# Patient Record
Sex: MALE | Race: WHITE | Employment: OTHER | URBAN - METROPOLITAN AREA
[De-identification: names, ages, dates, MRNs, and addresses within clinical notes are randomized per-mention and may not be internally consistent; named-entity substitution may affect disease eponyms.]

---

## 2017-05-25 PROBLEM — M54.17 LUMBOSACRAL RADICULITIS: Status: ACTIVE | Noted: 2017-05-25

## 2017-10-31 PROBLEM — M48.062 SPINAL STENOSIS OF LUMBAR REGION WITH NEUROGENIC CLAUDICATION: Status: ACTIVE | Noted: 2017-10-31

## 2018-06-25 ENCOUNTER — HOSPITAL ENCOUNTER (OUTPATIENT)
Age: 73
Discharge: HOME | End: 2018-06-25
Payer: COMMERCIAL

## 2018-06-25 LAB
SEDIMENTATION RATE, ERYTHROCYTE: 8 MM/HR (ref 0–15)
URIC ACID, SERUM: 5.8 MG/DL (ref 3.4–7)

## 2018-06-25 PROCEDURE — 36415 COLL VENOUS BLD VENIPUNCTURE: CPT

## 2018-06-25 PROCEDURE — 86038 ANTINUCLEAR ANTIBODIES: CPT

## 2018-06-25 PROCEDURE — 86431 RHEUMATOID FACTOR QUANT: CPT

## 2018-06-25 PROCEDURE — 84550 ASSAY OF BLOOD/URIC ACID: CPT

## 2018-06-25 PROCEDURE — 85651 RBC SED RATE NONAUTOMATED: CPT

## 2018-06-26 LAB
ANTI-NUCLEAR ANTIBODY (ANA): NEGATIVE
RHEUMATOID FACTOR: 14 IU/ML (ref 0–13)

## 2019-06-13 ENCOUNTER — TELEPHONE (OUTPATIENT)
Dept: FAMILY MEDICINE CLINIC | Age: 74
End: 2019-06-13

## 2019-06-13 RX ORDER — MELOXICAM 15 MG/1
15 TABLET ORAL 2 TIMES DAILY
COMMUNITY
Start: 2018-12-27 | End: 2019-06-14 | Stop reason: SDUPTHER

## 2019-06-17 RX ORDER — MELOXICAM 15 MG/1
15 TABLET ORAL 2 TIMES DAILY
Qty: 180 TABLET | Refills: 1 | Status: SHIPPED | OUTPATIENT
Start: 2019-06-17 | End: 2019-07-29 | Stop reason: SDUPTHER

## 2019-07-29 RX ORDER — MELOXICAM 15 MG/1
15 TABLET ORAL DAILY
Qty: 90 TABLET | Refills: 1 | Status: SHIPPED | OUTPATIENT
Start: 2019-07-29

## 2019-07-30 ENCOUNTER — OFFICE VISIT (OUTPATIENT)
Dept: RHEUMATOLOGY | Age: 74
End: 2019-07-30
Payer: COMMERCIAL

## 2019-07-30 ENCOUNTER — HOSPITAL ENCOUNTER (OUTPATIENT)
Age: 74
Discharge: HOME | End: 2019-08-01
Payer: COMMERCIAL

## 2019-07-30 VITALS
HEIGHT: 66 IN | WEIGHT: 152.6 LBS | HEART RATE: 70 BPM | BODY MASS INDEX: 24.53 KG/M2 | OXYGEN SATURATION: 98 % | SYSTOLIC BLOOD PRESSURE: 118 MMHG | TEMPERATURE: 98 F | DIASTOLIC BLOOD PRESSURE: 76 MMHG

## 2019-07-30 DIAGNOSIS — M15.9 PRIMARY OSTEOARTHRITIS INVOLVING MULTIPLE JOINTS: ICD-10-CM

## 2019-07-30 LAB
ALBUMIN SERPL-MCNC: 4.4 G/DL (ref 3.5–5.2)
ALP BLD-CCNC: 100 U/L (ref 40–129)
ALT SERPL-CCNC: 24 U/L (ref 0–40)
ANION GAP SERPL CALCULATED.3IONS-SCNC: 14 MMOL/L (ref 7–16)
AST SERPL-CCNC: 30 U/L (ref 0–39)
BASOPHILS ABSOLUTE: 0.04 E9/L (ref 0–0.2)
BASOPHILS RELATIVE PERCENT: 0.4 % (ref 0–2)
BILIRUB SERPL-MCNC: 0.6 MG/DL (ref 0–1.2)
BUN BLDV-MCNC: 16 MG/DL (ref 8–23)
CALCIUM SERPL-MCNC: 9.4 MG/DL (ref 8.6–10.2)
CHLORIDE BLD-SCNC: 101 MMOL/L (ref 98–107)
CO2: 25 MMOL/L (ref 22–29)
CREAT SERPL-MCNC: 1.1 MG/DL (ref 0.7–1.2)
EOSINOPHILS ABSOLUTE: 0.18 E9/L (ref 0.05–0.5)
EOSINOPHILS RELATIVE PERCENT: 1.9 % (ref 0–6)
GFR AFRICAN AMERICAN: >60
GFR NON-AFRICAN AMERICAN: >60 ML/MIN/1.73
GLUCOSE BLD-MCNC: 84 MG/DL (ref 74–99)
HCT VFR BLD CALC: 47.9 % (ref 37–54)
HEMOGLOBIN: 15.9 G/DL (ref 12.5–16.5)
IMMATURE GRANULOCYTES #: 0.02 E9/L
IMMATURE GRANULOCYTES %: 0.2 % (ref 0–5)
LYMPHOCYTES ABSOLUTE: 2.25 E9/L (ref 1.5–4)
LYMPHOCYTES RELATIVE PERCENT: 23.3 % (ref 20–42)
MCH RBC QN AUTO: 32.4 PG (ref 26–35)
MCHC RBC AUTO-ENTMCNC: 33.2 % (ref 32–34.5)
MCV RBC AUTO: 97.8 FL (ref 80–99.9)
MONOCYTES ABSOLUTE: 0.8 E9/L (ref 0.1–0.95)
MONOCYTES RELATIVE PERCENT: 8.3 % (ref 2–12)
NEUTROPHILS ABSOLUTE: 6.36 E9/L (ref 1.8–7.3)
NEUTROPHILS RELATIVE PERCENT: 65.9 % (ref 43–80)
PDW BLD-RTO: 12.8 FL (ref 11.5–15)
PLATELET # BLD: 237 E9/L (ref 130–450)
PMV BLD AUTO: 11.8 FL (ref 7–12)
POTASSIUM SERPL-SCNC: 4.7 MMOL/L (ref 3.5–5)
RBC # BLD: 4.9 E12/L (ref 3.8–5.8)
SODIUM BLD-SCNC: 140 MMOL/L (ref 132–146)
TOTAL PROTEIN: 7.6 G/DL (ref 6.4–8.3)
WBC # BLD: 9.7 E9/L (ref 4.5–11.5)

## 2019-07-30 PROCEDURE — 80053 COMPREHEN METABOLIC PANEL: CPT

## 2019-07-30 PROCEDURE — 36415 COLL VENOUS BLD VENIPUNCTURE: CPT

## 2019-07-30 PROCEDURE — 99214 OFFICE O/P EST MOD 30 MIN: CPT | Performed by: INTERNAL MEDICINE

## 2019-07-30 PROCEDURE — 85025 COMPLETE CBC W/AUTO DIFF WBC: CPT

## 2019-07-30 RX ORDER — IMIQUIMOD 12.5 MG/.25G
CREAM TOPICAL
Refills: 0 | COMMUNITY
Start: 2019-07-01 | End: 2020-08-05

## 2019-07-30 RX ORDER — TRIAMCINOLONE ACETONIDE 1 MG/G
CREAM TOPICAL
Refills: 0 | COMMUNITY
Start: 2019-06-03 | End: 2020-08-05

## 2019-07-30 ASSESSMENT — ENCOUNTER SYMPTOMS
DIARRHEA: 0
EYE PAIN: 0
COUGH: 0
SORE THROAT: 0
ABDOMINAL PAIN: 0
TROUBLE SWALLOWING: 0
SINUS PRESSURE: 0
BLOOD IN STOOL: 0
SINUS PAIN: 0
COLOR CHANGE: 0
EYE DISCHARGE: 0
SHORTNESS OF BREATH: 0
WHEEZING: 0
CONSTIPATION: 0
EYE REDNESS: 0
PHOTOPHOBIA: 0
VOMITING: 0
ABDOMINAL DISTENTION: 0
CHEST TIGHTNESS: 0
EYE ITCHING: 0

## 2019-07-30 NOTE — PROGRESS NOTES
medication toxicity. Return if symptoms worsen or fail to improve. Benjamin Liao MD     *This document was created using voice recognition software. Note was reviewed, however may contain grammatical errors.

## 2019-08-01 ENCOUNTER — TELEPHONE (OUTPATIENT)
Dept: RHEUMATOLOGY | Age: 74
End: 2019-08-01

## 2019-08-01 DIAGNOSIS — M15.9 PRIMARY OSTEOARTHRITIS INVOLVING MULTIPLE JOINTS: Primary | ICD-10-CM

## 2019-08-13 ENCOUNTER — TELEPHONE (OUTPATIENT)
Dept: RHEUMATOLOGY | Age: 74
End: 2019-08-13

## 2020-02-05 ENCOUNTER — HOSPITAL ENCOUNTER (OUTPATIENT)
Age: 75
Discharge: HOME | End: 2020-02-05
Payer: COMMERCIAL

## 2020-02-05 LAB
RHEUMATOID FACTOR: 16 IU/ML (ref 0–13)
SEDIMENTATION RATE, ERYTHROCYTE: 5 MM/HR (ref 0–15)
URIC ACID, SERUM: 6.1 MG/DL (ref 3.4–7)

## 2020-02-05 PROCEDURE — 86038 ANTINUCLEAR ANTIBODIES: CPT

## 2020-02-05 PROCEDURE — 86431 RHEUMATOID FACTOR QUANT: CPT

## 2020-02-05 PROCEDURE — 36415 COLL VENOUS BLD VENIPUNCTURE: CPT

## 2020-02-05 PROCEDURE — 85651 RBC SED RATE NONAUTOMATED: CPT

## 2020-02-05 PROCEDURE — 84550 ASSAY OF BLOOD/URIC ACID: CPT

## 2020-02-06 LAB — ANTI-NUCLEAR ANTIBODY (ANA): NEGATIVE

## 2020-08-05 ENCOUNTER — HOSPITAL ENCOUNTER (EMERGENCY)
Age: 75
Discharge: HOME | End: 2020-08-05
Payer: COMMERCIAL

## 2020-08-05 ENCOUNTER — APPOINTMENT (OUTPATIENT)
Dept: GENERAL RADIOLOGY | Age: 75
End: 2020-08-05
Payer: COMMERCIAL

## 2020-08-05 VITALS
TEMPERATURE: 97.8 F | HEART RATE: 89 BPM | SYSTOLIC BLOOD PRESSURE: 140 MMHG | OXYGEN SATURATION: 96 % | HEIGHT: 66 IN | WEIGHT: 150 LBS | RESPIRATION RATE: 16 BRPM | DIASTOLIC BLOOD PRESSURE: 82 MMHG | BODY MASS INDEX: 24.11 KG/M2

## 2020-08-05 PROCEDURE — 73630 X-RAY EXAM OF FOOT: CPT

## 2020-08-05 PROCEDURE — 99283 EMERGENCY DEPT VISIT LOW MDM: CPT

## 2020-08-05 PROCEDURE — 99281 EMR DPT VST MAYX REQ PHY/QHP: CPT

## 2020-08-05 PROCEDURE — 73590 X-RAY EXAM OF LOWER LEG: CPT

## 2020-08-05 PROCEDURE — 73610 X-RAY EXAM OF ANKLE: CPT

## 2020-08-05 RX ORDER — FOLIC ACID 1 MG/1
1 TABLET ORAL DAILY
COMMUNITY

## 2020-08-05 ASSESSMENT — PAIN DESCRIPTION - PAIN TYPE: TYPE: ACUTE PAIN

## 2020-08-05 ASSESSMENT — PAIN DESCRIPTION - PROGRESSION: CLINICAL_PROGRESSION: GRADUALLY WORSENING

## 2020-08-05 ASSESSMENT — PAIN DESCRIPTION - DESCRIPTORS: DESCRIPTORS: SORE

## 2020-08-05 ASSESSMENT — PAIN SCALES - GENERAL: PAINLEVEL_OUTOF10: 6

## 2020-08-05 ASSESSMENT — PAIN DESCRIPTION - ONSET: ONSET: SUDDEN

## 2020-08-05 ASSESSMENT — PAIN DESCRIPTION - LOCATION: LOCATION: ANKLE;LEG

## 2020-08-05 ASSESSMENT — PAIN DESCRIPTION - ORIENTATION: ORIENTATION: RIGHT

## 2020-08-05 ASSESSMENT — PAIN DESCRIPTION - FREQUENCY: FREQUENCY: CONTINUOUS

## 2020-08-05 NOTE — ED PROVIDER NOTES
[facility]    HPI:  8/5/20, Time: 1:55 PM EDT         Taylor Costa is a 69 y.o. male presenting to the ED for right ankle and lower leg pain, beginning 9 days ago after a fall. The complaint has been persistent, moderate in severity, and worsened by standing, walking. Patient states that he did have pain to the lateral right ankle at that time and then reinjured his ankle by twisting and again approximately 5 days ago. Patient reports that the pain is located to the right lateral foot and ankle that does extend several inches up into the right lateral lower leg. Denies any calf pain or leg swelling. Mild swelling to the right ankle has been noticed over the last several days. There is also been ecchymosis present for the last 9 days to that area. Denies any numbness tingling to the area. Full range of motion of all the joints in the right lower extremity. He has been afebrile at home without recent travel or sick contacts. Patient denies all other symptoms at this time. Review of Systems:   Pertinent positives and negatives are stated within HPI, all other systems reviewed and are negative.          --------------------------------------------- PAST HISTORY ---------------------------------------------  Past Medical History:  has a past medical history of Osteoarthritis. Past Surgical History:  has a past surgical history that includes Nerve Block (Bilateral, 05/25/2017); Nerve Block (Bilateral, 05/25/2017); Nerve Block (Bilateral, 06/08/2017); Nerve Block (12/14/2017); and Nerve Block (N/A, 02/08/2018). Social History:  reports that he has been smoking cigarettes. He has a 7.50 pack-year smoking history. He has never used smokeless tobacco. He reports that he does not drink alcohol or use drugs. Family History: family history includes Cancer in his father; Heart Disease in his mother. The patients home medications have been reviewed.     Allergies: Patient has no known allergies. -------------------------------------------------- RESULTS -------------------------------------------------  All laboratory and radiology results have been personally reviewed by myself   LABS:  No results found for this visit on 08/05/20. RADIOLOGY:  Interpreted by Radiologist.  XR FOOT RIGHT (MIN 3 VIEWS)   Final Result   1. Degenerative changes of the forefoot without evidence of fracture   or acute bony misalignment. Early bunion changes are developing at the   first MTP joint. 2. Lateral ankle soft tissue swelling could indicate a right ankle   sprain, but no underlying fracture or misalignment is noted. 3. Probable lateral ankle sprain, but no evidence of tibia or fibular   trauma, and no evidence of knee joint effusion or acute trauma. XR TIBIA FIBULA RIGHT (2 VIEWS)   Final Result   1. Degenerative changes of the forefoot without evidence of fracture   or acute bony misalignment. Early bunion changes are developing at the   first MTP joint. 2. Lateral ankle soft tissue swelling could indicate a right ankle   sprain, but no underlying fracture or misalignment is noted. 3. Probable lateral ankle sprain, but no evidence of tibia or fibular   trauma, and no evidence of knee joint effusion or acute trauma. XR ANKLE RIGHT (MIN 3 VIEWS)   Final Result   1. Degenerative changes of the forefoot without evidence of fracture   or acute bony misalignment. Early bunion changes are developing at the   first MTP joint. 2. Lateral ankle soft tissue swelling could indicate a right ankle   sprain, but no underlying fracture or misalignment is noted. 3. Probable lateral ankle sprain, but no evidence of tibia or fibular   trauma, and no evidence of knee joint effusion or acute trauma. ------------------------- NURSING NOTES AND VITALS REVIEWED ---------------------------   The nursing notes within the ED encounter and vital signs as below have been reviewed.    BP (!) 140/82 specific details for the plan of care and counseling regarding the diagnosis and prognosis. Questions are answered at this time and they are agreeable with the plan.      --------------------------------- IMPRESSION AND DISPOSITION ---------------------------------    IMPRESSION  1. Sprain of anterior talofibular ligament of right ankle, initial encounter    2. Strain of muscle(s) and tendon(s) of peroneal muscle group at lower leg level, right leg, initial encounter        DISPOSITION  Disposition: Discharge to home  Patient condition is stable      NOTE: This report was transcribed using voice recognition software.  Every effort was made to ensure accuracy; however, inadvertent computerized transcription errors may be present        Roula Meadma  08/05/20 1942

## 2020-10-06 ENCOUNTER — HOSPITAL ENCOUNTER (OUTPATIENT)
Dept: PHYSICAL THERAPY | Age: 75
Setting detail: THERAPIES SERIES
Discharge: HOME | End: 2020-10-06
Payer: COMMERCIAL

## 2020-10-09 ENCOUNTER — HOSPITAL ENCOUNTER (OUTPATIENT)
Dept: PHYSICAL THERAPY | Age: 75
Setting detail: THERAPIES SERIES
Discharge: HOME | End: 2020-10-09
Payer: COMMERCIAL

## 2020-10-09 PROCEDURE — 97161 PT EVAL LOW COMPLEX 20 MIN: CPT | Performed by: PHYSICAL THERAPIST

## 2020-10-09 NOTE — PROGRESS NOTES
[address] [facility]                Phone: [phone]   Fax: [phone]    Physical Therapy Daily Treatment Note  Date:  10/9/2020    Patient Name:  Emily Nguyễn    :    MRN: [id number]    Evaluating therapist:  MURRAY Prakash                    Andrew  Restrictions/Precautions:    Diagnosis:  vascular diseases  Treatment Diagnosis:    Insurance/Certification information:  MMO  PPO  Referring Physician:  Clari Garcia. Plan of care signed (Y/N):  Y  Visit# / total visits:    Pain level: /10   Time In:  Time Out:    Subjective:      Exercises:  Exercise/Equipment Resistance/Repetitions Other comments   StepOne              seated hip abd                         flex                 knee ext            sit/stand            toe raises     step ups             standing for time            single leg stance      foam marching       side/side amb                              Other Therapeutic Activities:      Home Exercise Program:      Manual Treatments:      Modalities:      Timed Code Treatment Minutes: Total Treatment Minutes:      Treatment/Activity Tolerance:  [] Patient tolerated treatment well [] Patient limited by fatique  [] Patient limited by pain  [] Patient limited by other medical complications  [] Other:     Prognosis: [] Good [] Fair  [] Poor    Patient Requires Follow-up: [] Yes  [] No    Plan:   [] Continue per plan of care [] Alter current plan (see comments)  [] Plan of care initiated [] Hold pending MD visit [] Discharge  Plan for Next Session:      See Weekly Progress Note: []  Yes  []  No  Next due:        Electronically signed by:   Marina Schmitt

## 2020-10-09 NOTE — PROGRESS NOTES
Physical Therapy  Initial Assessment  Date: 10/9/2020  Patient Name: Teena Castillo  MRN: [id number]  : 1945        Subjective   General  Chart Reviewed: Yes  Referring Practitioner: Janet Loomis Referral Date : 20  Diagnosis: vascular disease  PT Visit Information  Onset Date: 20  PT Insurance Information: MMO [employer] PPO  Total # of Visits Approved: 8-12  Total # of Visits to Date: 1  Subjective  Subjective: pt presents to therapy with c/o weakness/swelling in both LE's and hands at times for quite sometime; tells PT he has lost strength and is unable to work out as much as he used to; [employer] [place] [employer] N[employer] as well as swelling in both LE's hampering movement; occurs more so when weather is cool/cold; tells PT he is walking for short distances at  home using [employer] or furniture; sleep is fine; RTD for follow-up after therapy completed     Objective     Observation/Palpation  Observation: pt able to assume/maintain proper sitting posture, presents with forward trunk lean in standing    AROM RLE (degrees)  RLE AROM: WNL  AROM LLE (degrees)  LLE AROM : WNL  AROM RUE (degrees)  RUE AROM : WNL  AROM LUE (degrees)  LUE AROM : WNL    Strength Other  Other: grossly 5/5 for all ranges B UE; grossly 3+, 4-/5  for all ranges     Additional Measures  Other: endurance for all prolonged activities is iWOPI to Stand: Moderate Assistance  Stand Pivot Transfers: Moderate Assistance  Lateral Transfers: Moderate Assistance     Ambulation  Ambulation?: Yes  Ambulation 1  Surface: level tile  Device: Rolling Walker  Assistance:  Moderate assistance  Quality of Gait: slow/guarded ayad with short/unequal strides/stance; B foot flat contact noted with hyperext at L knee  Balance  Comments: static/dynamic balance is POOR+; TINETTI score:       Assessment   Conditions Requiring Skilled Therapeutic Intervention  Body structures, Functions, Activity limitations: Decreased strength;Decreased endurance;Decreased balance;Decreased functional mobility   Prognosis: Fair  Decision Making: Low Complexity  Activity Tolerance  Activity Tolerance: Patient Tolerated treatment well       Plan   Plan  Times per week: pt to be seen 2x/week/6 weeks  Current Treatment Recommendations: ROM, Strengthening, Balance Training, Endurance Training, Functional Mobility Training    OutComes Score  Balance Score: 4 (10/09/20 1039)  Gait Score: 5 (10/09/20 1039)        Tinetti Total Score: 9 (10/09/20 1039)        Goals  Time Frame for goals: 6 weeks  goal 1: increase strength across B LE's to grossly 4+, 5/5 for all ranges improving static/dynamic balance to FAIR+/GOOD-  goal 2: improve heel-toe progression and stance on B LE's with jere assistive device for distances of 100-125'  goal 3: improve all functional transfers to SBA/I  goal 4: improve endurance for all prolonged activities to GOOD  goal 5: assure I with HEP for home management of condition    Patient goals : to be more stable on his feet and be able to walk better          [employer], PT

## 2020-10-12 ENCOUNTER — HOSPITAL ENCOUNTER (OUTPATIENT)
Dept: PHYSICAL THERAPY | Age: 75
Setting detail: THERAPIES SERIES
Discharge: HOME | End: 2020-10-12
Payer: COMMERCIAL

## 2020-10-12 PROCEDURE — 97530 THERAPEUTIC ACTIVITIES: CPT | Performed by: PHYSICAL THERAPIST

## 2020-10-12 PROCEDURE — 97110 THERAPEUTIC EXERCISES: CPT | Performed by: PHYSICAL THERAPIST

## 2020-10-12 NOTE — PROGRESS NOTES
[address] [facility]                Phone: [phone]   Fax: [phone]    Physical Therapy Daily Treatment Note  Date:  10/12/2020    Patient Name:  Sudha Lucas    :  [birth year]  MRN: [id number]    Evaluating therapist:  MURRAY Mtz                    )  Restrictions/Precautions:    Diagnosis:  vascular diseases  Treatment Diagnosis:    Insurance/Certification information:  MMO  PPO  Referring Physician:  Niya Ward Plan of care signed (Y/N):  Y  Visit# / total visits:    Pain level: /10   Time In:  957  Time Out:  1056    Subjective:      Exercises:  Exercise/Equipment Resistance/Repetitions Other comments   StepOne   10 min            seated hip abd   [id number]                      flex [id number]                knee ext 2x61x3gm           sit/stand 2x10           toe raises 2x15    step ups 2x10x4\"            standing for time 2x3:00           single leg stance      foam marching       side/side amb                              Other Therapeutic Activities:      Home Exercise Program:  provided 10/12/20    Manual Treatments:      Modalities:      Timed Code Treatment Minutes: Total Treatment Minutes:      Treatment/Activity Tolerance:  [] Patient tolerated treatment well [] Patient limited by fatique  [] Patient limited by pain  [] Patient limited by other medical complications  [] Other:     Prognosis: [] Good [] Fair  [] Poor    Patient Requires Follow-up: [] Yes  [] No    Plan:   [] Continue per plan of care [] Alter current plan (see comments)  [] Plan of care initiated [] Hold pending MD visit [] Discharge  Plan for Next Session:      See Weekly Progress Note: []  Yes  []  No  Next due:        Electronically signed by:   Lissette Connell

## 2020-10-16 ENCOUNTER — HOSPITAL ENCOUNTER (OUTPATIENT)
Dept: PHYSICAL THERAPY | Age: 75
Setting detail: THERAPIES SERIES
Discharge: HOME | End: 2020-10-16
Payer: COMMERCIAL

## 2020-10-16 PROCEDURE — 97110 THERAPEUTIC EXERCISES: CPT | Performed by: PHYSICAL THERAPIST

## 2020-10-16 PROCEDURE — 97530 THERAPEUTIC ACTIVITIES: CPT | Performed by: PHYSICAL THERAPIST

## 2020-10-16 NOTE — PROGRESS NOTES
[address] [facility]                Phone: [phone]   Fax: [phone]    Physical Therapy Daily Treatment Note  Date:  10/16/2020    Patient Name:  Obed Mcarthur    :    MRN: [id number]    Evaluating therapist:  MURRAY Demarco                    Andrew  Restrictions/Precautions:    Diagnosis:  vascular diseases  Treatment Diagnosis:    Insurance/Certification information:  MMO  PPO  Referring Physician:  Pako Felder Plan of care signed (Y/N):  Y  Visit# / total visits:  3/8  Pain level: /10   Time In:  1102  Time Out:  1158    Subjective:      Exercises:  Exercise/Equipment Resistance/Repetitions Other comments   StepOne   10 min            seated hip abd   4x01hyx                      flex 9e09o0iz                knee ext 3o61c0ei           sit/stand 3x10           toe raises 2x15    step ups 2x10x4\"            standing for time 2x2:00           single leg stance 4x15s ea      foam marching       side/side amb                              Other Therapeutic Activities:      Home Exercise Program:  provided 10/12/20    Manual Treatments:      Modalities:      Timed Code Treatment Minutes: Total Treatment Minutes:      Treatment/Activity Tolerance:  [] Patient tolerated treatment well [] Patient limited by fatique  [] Patient limited by pain  [] Patient limited by other medical complications  [] Other:     Prognosis: [] Good [] Fair  [] Poor    Patient Requires Follow-up: [] Yes  [] No    Plan:   [] Continue per plan of care [] Alter current plan (see comments)  [] Plan of care initiated [] Hold pending MD visit [] Discharge  Plan for Next Session:      See Weekly Progress Note: []  Yes  []  No  Next due:        Electronically signed by:   Margaret Lindsey

## 2020-10-16 NOTE — PROGRESS NOTES
S:  pt presents to therapy for two of two scheduled visits this week; at week's end he reports that he continues to have trouble standing/walking for prolonged periods; using [employer] at home and is doing HEP faithfully; no c/o buckling or LOB over week's time    O:  performed the exercises/tasks as written in the flowsheet for the week ending 10/16/20; initiated HEP for home management of condition; strength across B LE's grossly 4-, 4/5 for all ranges    A:  minh tx well; pt able to perform all requested tasks with good form and pacing noted; no c/o SOB during or after tx; strength across B LE's grossly stable since eval; transfers sit/stand MIN ASST to CG of one; gait slow/guarded with short/equal strides and heel-toe progression noted; pt exhibits some hyperext at both knees in gait with L being worse than R;  static/dynamic balance is POOR+/FAIR-; endurance for all prolonged activities is GOOD-    P:  cont with POC of strengthening/balance/endurance activities as pt tolerates

## 2020-10-19 ENCOUNTER — HOSPITAL ENCOUNTER (OUTPATIENT)
Dept: PHYSICAL THERAPY | Age: 75
Setting detail: THERAPIES SERIES
Discharge: HOME | End: 2020-10-19
Payer: COMMERCIAL

## 2020-10-19 PROCEDURE — 9900000074 HC THERAPEUTIC ACTIVITIES PER 15 MIN (SELF-PAY): Performed by: PHYSICAL THERAPIST

## 2020-10-19 PROCEDURE — 97110 THERAPEUTIC EXERCISES: CPT | Performed by: PHYSICAL THERAPIST

## 2020-10-19 NOTE — PROGRESS NOTES
[address] [facility]                Phone: [phone]   Fax: [phone]    Physical Therapy Daily Treatment Note  Date:  10/19/2020    Patient Name:  Tony Paulino    :    MRN: [id number]    Evaluating therapist:  MURRAY Cummings                    )  Restrictions/Precautions:    Diagnosis:  vascular diseases  Treatment Diagnosis:    Insurance/Certification information:  MMO  PPO  Referring Physician:  Sol Dai Plan of care signed (Y/N):  Y  Visit# / total visits:    Pain level: /10   Time In:  1035  Time Out:  1138    Subjective:      Exercises:  Exercise/Equipment Resistance/Repetitions Other comments   StepOne   10 min            seated hip abd   [id number]                      flex [id number]                knee ext 4r90y7tp           sit/stand 3x10           toe raises 2x15    step ups 2x10x4\"            standing for time 2x2:00           single leg stance 4x15s ea      foam marching  3x30s     side/side amb                              Other Therapeutic Activities:      Home Exercise Program:  provided 10/12/20    Manual Treatments:      Modalities:      Timed Code Treatment Minutes: Total Treatment Minutes:      Treatment/Activity Tolerance:  [] Patient tolerated treatment well [] Patient limited by fatique  [] Patient limited by pain  [] Patient limited by other medical complications  [] Other:     Prognosis: [] Good [] Fair  [] Poor    Patient Requires Follow-up: [] Yes  [] No    Plan:   [] Continue per plan of care [] Alter current plan (see comments)  [] Plan of care initiated [] Hold pending MD visit [] Discharge  Plan for Next Session:      See Weekly Progress Note: []  Yes  []  No  Next due:        Electronically signed by:   Odalys Marks

## 2020-10-23 ENCOUNTER — HOSPITAL ENCOUNTER (OUTPATIENT)
Dept: PHYSICAL THERAPY | Age: 75
Setting detail: THERAPIES SERIES
Discharge: HOME | End: 2020-10-23
Payer: COMMERCIAL

## 2020-10-23 PROCEDURE — 97110 THERAPEUTIC EXERCISES: CPT | Performed by: PHYSICAL THERAPIST

## 2020-10-23 PROCEDURE — 97530 THERAPEUTIC ACTIVITIES: CPT | Performed by: PHYSICAL THERAPIST

## 2020-10-23 NOTE — PROGRESS NOTES
S:  pt presents to therapy for two of two scheduled visits this week; at week's end he reports that he continues to do his exercises at home and feels he is getting better/more stable on his feet; tells PT he is walking more in the house using furniture for support as well as using st cane when  he walks outside; no c/o buckling or LOB over week's time    O:  performed the exercises/tasks as written in the flowsheet for the week ending 10/23/20; increased intensity of the program across the board and updated HEP as needed; strength  across B LE's grossly  4/5 for all ranges    A:  minh tx well; pt able to perform all requested tasks with good form and pacing noted; no c/o SOB during or after tx; strength across B LE's grossly shows improvement across all ranges B LE's;  transfers sit/stand SBA;  gait seems quicker with longer strides noted with [employer];  pt continues to  exhibit some hyperext at both knees in gait with L being worse than R;  static/dynamic balance is FAIR; endurance for all prolonged activities is GOOD; pt advised to start walking with [employer] at all times and try to walk for longer distances/time periods to build stamina and stability across B LE's     P:  cont with POC of strengthening/balance/endurance activities as pt tolerates

## 2020-10-27 ENCOUNTER — HOSPITAL ENCOUNTER (OUTPATIENT)
Dept: PHYSICAL THERAPY | Age: 75
Setting detail: THERAPIES SERIES
Discharge: HOME | End: 2020-10-27
Payer: COMMERCIAL

## 2020-10-27 PROCEDURE — 97110 THERAPEUTIC EXERCISES: CPT | Performed by: PHYSICAL THERAPIST

## 2020-10-27 PROCEDURE — 9900000074 HC THERAPEUTIC ACTIVITIES PER 15 MIN (SELF-PAY): Performed by: PHYSICAL THERAPIST

## 2020-10-27 NOTE — PROGRESS NOTES
[address] [facility]                Phone: [phone]   Fax: [phone]    Physical Therapy Daily Treatment Note  Date:  10/27/2020    Patient Name:  Tony Paulino    :    MRN: [id number]    Evaluating therapist:  MURRAY Cummings                    (10/9/20)  Restrictions/Precautions:    Diagnosis:  vascular diseases  Treatment Diagnosis:    Insurance/Certification information:  MMO  PPO  Referring Physician:  Sol Dai Plan of care signed (Y/N):  Y  Visit# / total visits:    Pain level: /10   Time In:  1029  Time Out:  1133    Subjective:      Exercises:  Exercise/Equipment Resistance/Repetitions Other comments   StepOne   10 min            seated hip abd   [id number]                      flex 0l42g6me                knee ext 3n55c6yc           sit/stand 3x10           toe raises 2x15    step ups 2x10x4\"            standing for time 2x3:00           single leg stance 4x20s ea      foam marching  4x30s     side/side amb                              Other Therapeutic Activities:      Home Exercise Program:  provided 10/12/20    Manual Treatments:      Modalities:      Timed Code Treatment Minutes: Total Treatment Minutes:      Treatment/Activity Tolerance:  [] Patient tolerated treatment well [] Patient limited by fatique  [] Patient limited by pain  [] Patient limited by other medical complications  [] Other:     Prognosis: [] Good [] Fair  [] Poor    Patient Requires Follow-up: [] Yes  [] No    Plan:   [] Continue per plan of care [] Alter current plan (see comments)  [] Plan of care initiated [] Hold pending MD visit [] Discharge  Plan for Next Session:      See Weekly Progress Note: []  Yes  []  No  Next due:        Electronically signed by:   Odalys Marks

## 2020-10-30 ENCOUNTER — HOSPITAL ENCOUNTER (OUTPATIENT)
Dept: PHYSICAL THERAPY | Age: 75
Setting detail: THERAPIES SERIES
Discharge: HOME | End: 2020-10-30
Payer: COMMERCIAL

## 2020-10-30 PROCEDURE — 97530 THERAPEUTIC ACTIVITIES: CPT | Performed by: PHYSICAL THERAPIST

## 2020-10-30 PROCEDURE — 97110 THERAPEUTIC EXERCISES: CPT | Performed by: PHYSICAL THERAPIST

## 2020-10-30 NOTE — PROGRESS NOTES
[address] [facility]                Phone: [phone]   Fax: [phone]    Physical Therapy Daily Treatment Note  Date:  10/30/2020    Patient Name:  Juan A Rivera    :    MRN: [id number]    Evaluating therapist:  MURRAY Montoya                    (10/9/20)  Restrictions/Precautions:    Diagnosis:  vascular diseases  Treatment Diagnosis:    Insurance/Certification information:  MMO  PPO  Referring Physician:  Araceli Douglas Plan of care signed (Y/N):  Y  Visit# / total visits:    Pain level: /10   Time In:  1030  Time Out:  1127    Subjective:      Exercises:  Exercise/Equipment Resistance/Repetitions Other comments   StepOne   10 min            seated hip abd   [id number]                      flex 4d16m3eb                knee ext 6m03m4pb           sit/stand 3x10           toe raises 2x15    step ups 2x10x4\"            standing for time 2x3:00           single leg stance 4x20s ea      foam marching  4x30s     side/side amb                              Other Therapeutic Activities:      Home Exercise Program:  provided 10/12/20    Manual Treatments:      Modalities:      Timed Code Treatment Minutes: Total Treatment Minutes:      Treatment/Activity Tolerance:  [] Patient tolerated treatment well [] Patient limited by fatique  [] Patient limited by pain  [] Patient limited by other medical complications  [] Other:     Prognosis: [] Good [] Fair  [] Poor    Patient Requires Follow-up: [] Yes  [] No    Plan:   [] Continue per plan of care [] Alter current plan (see comments)  [] Plan of care initiated [] Hold pending MD visit [] Discharge  Plan for Next Session:      See Weekly Progress Note: []  Yes  []  No  Next due:        Electronically signed by:   Josie Machuca

## 2020-10-30 NOTE — PROGRESS NOTES
S:  pt presents to therapy for two of two scheduled visits this week; at week's end he reports that he continues to do his exercises at home and feels good overall; PT he is still walking more in the house using furniture for support as well as using st cane when  he walks outside; no c/o buckling or LOB over week's time    O:  performed the exercises/tasks as written in the flowsheet for the week ending 10/30/20; strength  across B LE's grossly  4/5 for all ranges    A:  minh tx well; pt able to perform all requested tasks with good form and pacing noted; no c/o SOB during or after tx; strength across B LE's grossly stable since last week; transfers sit/stand SBA;  gait remains stable overall with longer strides noted with [employer];  pt continues to  exhibit some hyperext at both knees in gait with L being worse than R;  static/dynamic balance is FAIR+; endurance for all prolonged activities is GOOD; pt again advised to start walking with WW at all times and try to walk for longer distances/time periods to build stamina and stability across B LE's     P:  cont with POC of strengthening/balance/endurance activities as pt tolerates

## 2020-11-02 ENCOUNTER — HOSPITAL ENCOUNTER (OUTPATIENT)
Dept: PHYSICAL THERAPY | Age: 75
Setting detail: THERAPIES SERIES
Discharge: HOME | End: 2020-11-02
Payer: COMMERCIAL

## 2020-11-02 PROCEDURE — 97110 THERAPEUTIC EXERCISES: CPT | Performed by: PHYSICAL THERAPIST

## 2020-11-02 PROCEDURE — 97530 THERAPEUTIC ACTIVITIES: CPT | Performed by: PHYSICAL THERAPIST

## 2020-11-02 NOTE — PROGRESS NOTES
[address] [facility]                Phone: [phone]   Fax: [phone]    Physical Therapy Daily Treatment Note  Date:  2020    Patient Name:  Cecilio Sanchez    :  [birth year]  MRN: [id number]    Evaluating therapist:  MURRAY Smith                    (10/9/20)  Restrictions/Precautions:    Diagnosis:  vascular diseases  Treatment Diagnosis:    Insurance/Certification information:  MMO  PPO  Referring Physician:  Angelito Real Plan of care signed (Y/N):  Y  Visit# / total visits:    Pain level: /10   Time In:  1026  Time Out:  1144    Subjective:      Exercises:  Exercise/Equipment Resistance/Repetitions Other comments   StepOne   10 min            seated hip abd   [id number]                      flex [id number]                knee ext [id number]           sit/stand 3x10           toe raises 2x15    step ups 2x10x4\"            standing for time 2x3:00           single leg stance 4x20s ea      foam marching  4x30s     side/side amb                              Other Therapeutic Activities:      Home Exercise Program:  provided 10/12/20    Manual Treatments:      Modalities:      Timed Code Treatment Minutes: Total Treatment Minutes:      Treatment/Activity Tolerance:  [] Patient tolerated treatment well [] Patient limited by fatique  [] Patient limited by pain  [] Patient limited by other medical complications  [] Other:     Prognosis: [] Good [] Fair  [] Poor    Patient Requires Follow-up: [] Yes  [] No    Plan:   [] Continue per plan of care [] Alter current plan (see comments)  [] Plan of care initiated [] Hold pending MD visit [] Discharge  Plan for Next Session:      See Weekly Progress Note: []  Yes  []  No  Next due:        Electronically signed by:   Jazmín Moss

## 2020-11-06 ENCOUNTER — HOSPITAL ENCOUNTER (OUTPATIENT)
Dept: PHYSICAL THERAPY | Age: 75
Setting detail: THERAPIES SERIES
Discharge: HOME | End: 2020-11-06
Payer: COMMERCIAL

## 2020-11-09 ENCOUNTER — HOSPITAL ENCOUNTER (OUTPATIENT)
Dept: PHYSICAL THERAPY | Age: 75
Setting detail: THERAPIES SERIES
Discharge: HOME | End: 2020-11-09
Payer: COMMERCIAL

## 2020-11-09 PROCEDURE — 97530 THERAPEUTIC ACTIVITIES: CPT

## 2020-11-09 PROCEDURE — 97110 THERAPEUTIC EXERCISES: CPT

## 2020-11-09 NOTE — PROGRESS NOTES
Prognosis: [] Good [] Fair  [] Poor    Patient Requires Follow-up: [] Yes  [] No    Plan:   [] Continue per plan of care [] Alter current plan (see comments)  [] Plan of care initiated [] Hold pending MD visit [] Discharge  Plan for Next Session:      See Weekly Progress Note: []  Yes  []  No  Next due:        Electronically signed by:  Daniel Prasad, [facility]

## 2020-11-20 ENCOUNTER — HOSPITAL ENCOUNTER (OUTPATIENT)
Dept: PHYSICAL THERAPY | Age: 75
Setting detail: THERAPIES SERIES
Discharge: HOME | End: 2020-11-20
Payer: COMMERCIAL

## 2020-11-20 PROCEDURE — 97110 THERAPEUTIC EXERCISES: CPT | Performed by: PHYSICAL THERAPIST

## 2020-11-20 PROCEDURE — 97530 THERAPEUTIC ACTIVITIES: CPT | Performed by: PHYSICAL THERAPIST

## 2020-11-20 NOTE — PROGRESS NOTES
S:  pt presents to therapy for first visit since 11/9/20 due to therapist illness; at  this time he reports that he continues to do his exercises at home and feels good overall; PT he is still walking more in the house using furniture for support as well as using st cane when  he walks outside; no c/o buckling or LOB over week's time; states he has appt with neurologist in [facility] [employer] [place] next Tuesday to address hands/swelling     O:  performed the exercises/tasks as written in the flowsheet for the week ending 11/20/20; strength  across B LE's grossly  4/, 4+5 for all ranges    A:  minh tx well; pt able to perform all requested tasks with good form and pacing noted; no c/o SOB during or after tx; strength across B LE's shows improvement in all ranges; transfers sit/stand SBA/I;  gait remains slow/guarded but stable overall with longer strides noted with [employer];  pt continues to  exhibit hyperext at both knees in gait with L being worse than R;  static/dynamic balance is FAIR+/GOOD-; endurance for all prolonged activities is GOOD/GOOD+; pt again advised to start walking with [employer] at all times for safety's sake and try to walk for longer distances/time periods to build stamina and stability across B LE's     P:  cont with POC of strengthening/balance/endurance activities as pt tolerates

## 2020-11-20 NOTE — PROGRESS NOTES
[address] [facility]                Phone: [phone]   Fax: [phone]    Physical Therapy Daily Treatment Note  Date:  2020    Patient Name:  Humberto Hunter    :    MRN: [id number]    Evaluating therapist:  MURRAY Travis                    (10/9/20)  Restrictions/Precautions:    Diagnosis:  vascular diseases  Treatment Diagnosis:    Insurance/Certification information:  MMO  PPO  Referring Physician:  Perry Soto Plan of care signed (Y/N):  Y  Visit# / total visits:  10/12  Pain level: /10     Time In:   1034  Time Out:   1139    Subjective:         Exercises:  Exercise/Equipment Resistance/Repetitions Other comments   StepOne   10 min            seated hip abd   [id number]                      flex [id number]                knee ext [id number]           sit/stand 3x10           toe raises 2x15    step ups 2x10x4\"            standing for time 2x3:00           single leg stance 4x20s ea      foam marching  4x30s     side/side amb                              Objective:   Ther. exercise/activity as listed per flow sheet above. No modalities. Assessment:        Home Exercise Program:  provided 10/12/20    Manual Treatments:      Modalities:      Timed Code Treatment Minutes: Total Treatment Minutes:      Treatment/Activity Tolerance:  [] Patient tolerated treatment well [] Patient limited by fatique  [] Patient limited by pain  [] Patient limited by other medical complications  [] Other:     Prognosis: [] Good [] Fair  [] Poor    Patient Requires Follow-up: [] Yes  [] No    Plan:   [] Continue per plan of care [] Alter current plan (see comments)  [] Plan of care initiated [] Hold pending MD visit [] Discharge  Plan for Next Session:      See Weekly Progress Note: []  Yes  []  No  Next due:        Electronically signed by:   MURRAY Marks

## 2020-11-25 ENCOUNTER — HOSPITAL ENCOUNTER (OUTPATIENT)
Dept: PHYSICAL THERAPY | Age: 75
Setting detail: THERAPIES SERIES
Discharge: HOME | End: 2020-11-25
Payer: COMMERCIAL

## 2020-11-25 PROCEDURE — 97110 THERAPEUTIC EXERCISES: CPT | Performed by: PHYSICAL THERAPIST

## 2020-11-25 PROCEDURE — 97530 THERAPEUTIC ACTIVITIES: CPT | Performed by: PHYSICAL THERAPIST

## 2020-11-25 NOTE — PROGRESS NOTES
S:  pt presents to therapy for only scheduled visit for the week; at this time he tells PT that  he continues to do his exercises at home and feels good overall; PT he is still walking more in the house using furniture for support as well as using st cane when  he walks outside; went to [facility] [employer] this week for neuro appt and tells PT that he had MRI and EMG done with result pending and that they are looking into medication options; no c/o buckling or LOB over week's time    O:  performed the exercises/tasks as written in the flowsheet for the week ending 11/27/20; strength  across B LE's grossly  4/, 4+5 for all ranges    A:  minh tx well; pt able to perform all requested tasks with good form and pacing noted; no c/o SOB during or after tx; strength across B LE's shows improvement in all ranges; transfers sit/stand SBA/I;  gait remains slow/guarded but stable overall with longer strides noted with [employer];  pt continues to  exhibit hyperext but it appears to be improving across B LE's;   static/dynamic balance is GOOD-; endurance for all prolonged activities remains at  GOOD/GOOD+; pt again advised to start walking with [employer] at all times for safety's sake and try to walk for longer distances/time periods to build stamina and stability across B LE's     P:  cont with POC of strengthening/balance/endurance activities as pt tolerates

## 2020-11-28 ENCOUNTER — HOSPITAL ENCOUNTER (OUTPATIENT)
Age: 75
Discharge: HOME | End: 2020-11-28
Payer: COMMERCIAL

## 2020-11-28 LAB
ALBUMIN SERPL-MCNC: 4.4 G/DL (ref 3.5–5.2)
ALP BLD-CCNC: 64 U/L (ref 40–129)
ALT SERPL-CCNC: 12 U/L (ref 0–40)
ANION GAP SERPL CALCULATED.3IONS-SCNC: 11 MMOL/L (ref 7–16)
AST SERPL-CCNC: 16 U/L (ref 0–39)
BASOPHILS ABSOLUTE: 0.06 E9/L (ref 0–0.2)
BASOPHILS RELATIVE PERCENT: 0.7 % (ref 0–2)
BILIRUB SERPL-MCNC: 0.7 MG/DL (ref 0–1.2)
BUN BLDV-MCNC: 9 MG/DL (ref 8–23)
C-REACTIVE PROTEIN: <0.1 MG/DL (ref 0–0.4)
CALCIUM SERPL-MCNC: 10.1 MG/DL (ref 8.6–10.2)
CHLORIDE BLD-SCNC: 102 MMOL/L (ref 98–107)
CO2: 28 MMOL/L (ref 22–29)
CREAT SERPL-MCNC: 1 MG/DL (ref 0.7–1.2)
EOSINOPHILS ABSOLUTE: 0.29 E9/L (ref 0.05–0.5)
EOSINOPHILS RELATIVE PERCENT: 3.4 % (ref 0–6)
GFR AFRICAN AMERICAN: >60
GFR NON-AFRICAN AMERICAN: >60 ML/MIN/1.73
GLUCOSE BLD-MCNC: 94 MG/DL (ref 74–99)
HCT VFR BLD CALC: 47.9 % (ref 37–54)
HEMOGLOBIN: 16.4 G/DL (ref 12.5–16.5)
IMMATURE GRANULOCYTES #: 0.01 E9/L
IMMATURE GRANULOCYTES %: 0.1 % (ref 0–5)
LYMPHOCYTES ABSOLUTE: 2.55 E9/L (ref 1.5–4)
LYMPHOCYTES RELATIVE PERCENT: 29.5 % (ref 20–42)
MCH RBC QN AUTO: 33.8 PG (ref 26–35)
MCHC RBC AUTO-ENTMCNC: 34.2 % (ref 32–34.5)
MCV RBC AUTO: 98.8 FL (ref 80–99.9)
MONOCYTES ABSOLUTE: 0.8 E9/L (ref 0.1–0.95)
MONOCYTES RELATIVE PERCENT: 9.3 % (ref 2–12)
NEUTROPHILS ABSOLUTE: 4.92 E9/L (ref 1.8–7.3)
NEUTROPHILS RELATIVE PERCENT: 57 % (ref 43–80)
PDW BLD-RTO: 13.1 FL (ref 11.5–15)
PLATELET # BLD: 244 E9/L (ref 130–450)
PMV BLD AUTO: 10.9 FL (ref 7–12)
POTASSIUM SERPL-SCNC: 5 MMOL/L (ref 3.5–5)
RBC # BLD: 4.85 E12/L (ref 3.8–5.8)
SEDIMENTATION RATE, ERYTHROCYTE: 5 MM/HR (ref 0–15)
SODIUM BLD-SCNC: 141 MMOL/L (ref 132–146)
TOTAL PROTEIN: 7.3 G/DL (ref 6.4–8.3)
WBC # BLD: 8.6 E9/L (ref 4.5–11.5)

## 2020-11-28 PROCEDURE — 86140 C-REACTIVE PROTEIN: CPT

## 2020-11-28 PROCEDURE — 85025 COMPLETE CBC W/AUTO DIFF WBC: CPT

## 2020-11-28 PROCEDURE — 85651 RBC SED RATE NONAUTOMATED: CPT

## 2020-11-28 PROCEDURE — 80053 COMPREHEN METABOLIC PANEL: CPT

## 2020-11-28 PROCEDURE — 36415 COLL VENOUS BLD VENIPUNCTURE: CPT

## 2020-11-30 ENCOUNTER — HOSPITAL ENCOUNTER (OUTPATIENT)
Dept: PHYSICAL THERAPY | Age: 75
Setting detail: THERAPIES SERIES
Discharge: HOME | End: 2020-11-30
Payer: COMMERCIAL

## 2020-11-30 PROCEDURE — 97110 THERAPEUTIC EXERCISES: CPT

## 2020-11-30 NOTE — PROGRESS NOTES
[address] [facility]                Phone: [phone]   Fax: [phone]    Physical Therapy Daily Treatment Note  Date:  2020    Patient Name:  Deedee Borrego    :    MRN: [id number]    Evaluating therapist:  MURRAY Herrera                    (10/9/20)  Restrictions/Precautions:    Diagnosis:  vascular diseases  Treatment Diagnosis:    Insurance/Certification information:  MMO  PPO  Referring Physician:  Fernando Marin Plan of care signed (Y/N):  Y  Visit# / total visits:    Pain level: /10     Time In:   1039  Time Out:   1135    Subjective:    Pt reporting the weather is significantly affecting his mobility, and muscles. Exercises:  Exercise/Equipment Resistance/Repetitions Other comments   StepOne   10 min            seated hip abd   [id number]                      flex [id number]                knee ext [id number]           sit/stand 3x10 Cued for hand placement and eccentric control          toe raises 2x15    step ups 2x10x4\" R knee buckled x 3 , pt able to self correct           standing for time 2x3:00 NT          single leg stance 4x20s ea  NT    foam marching  X 2 mins      side/side amb    NT                            Assessment:  Pt rested frequently today d/t fatigue. Pt R knee buckled as noted while performing step ups, pt able to self correct. No buckling of the L knee noted, but pt reports it does happen on occassion w/ fatigue. Noted pt braces LE's against the chair or mat when performing sit <> stands, w/ COG over his heels . Cued pt to correct.        Home Exercise Program:  provided 10/12/20    Manual Treatments:  NA    Modalities:  NA    Timed Code Treatment Minutes:  56    Total Treatment Minutes:  56    Treatment/Activity Tolerance:  [x] Patient tolerated treatment well [] Patient limited by fatique  [] Patient limited by pain  [] Patient limited by other medical complications  [] Other:     Prognosis: [] Good [] Fair  [] Poor    Patient Requires Follow-up: [] Yes  [] No    Plan:   [x] Continue per plan of care [] Alter current plan (see comments)  [] Plan of care initiated [] Hold pending MD visit [] Discharge  Plan for Next Session:      See Weekly Progress Note: []  Yes  []  No  Next due:        Electronically signed by:  Malachi Mike, PTA [id number]

## 2020-12-02 ENCOUNTER — HOSPITAL ENCOUNTER (OUTPATIENT)
Dept: PHYSICAL THERAPY | Age: 75
Setting detail: THERAPIES SERIES
Discharge: HOME | End: 2020-12-02
Payer: COMMERCIAL

## 2020-12-02 PROCEDURE — 97110 THERAPEUTIC EXERCISES: CPT | Performed by: PHYSICAL THERAPIST

## 2020-12-02 PROCEDURE — 97530 THERAPEUTIC ACTIVITIES: CPT | Performed by: PHYSICAL THERAPIST

## 2020-12-02 NOTE — PROGRESS NOTES
S:  pt presents to therapy for two of two scheduled visit for the week; at this time he tells PT that  he continues to do his exercises at home and feels good overall; PT he is still walking more in the house using furniture for support as well as using st cane when  he walks outside; went to [facility] this week for neuro appt and tells PT that he had MRI and EMG done with result pending and that they are looking into medication options; no c/o buckling or LOB over week's time    O:  performed the exercises/tasks as written in the flowsheet for the week ending 12/2/20; strength  across B LE's grossly  4/, 4+5 for all ranges    A:  minh tx well; pt able to perform all requested tasks with good form and pacing noted; no c/o SOB during or after tx; strength across B LE's shows improvement in all ranges; transfers sit/stand SBA/I;  gait remains slow/guarded but stable overall with longer strides noted with [employer];  pt continues to  exhibit hyperext but it appears to be improving across B LE's;   static/dynamic balance is GOOD-; endurance for all prolonged activities remains at  GOOD/GOOD+; pt again advised to start walking with [employer] at all times for safety's sake and try to walk for longer distances/time periods to build stamina and stability across B LE's     P:  cont with POC of strengthening/balance/endurance activities as pt tolerates

## 2020-12-02 NOTE — PROGRESS NOTES
[address] [facility]                Phone: [phone]   Fax: [phone]    Physical Therapy Daily Treatment Note  Date:  2020    Patient Name:  Sara Enamorado    :    MRN: [id number]    Evaluating therapist:  MURRAY Rosenthal                    (10/9/20)  Restrictions/Precautions:    Diagnosis:  vascular diseases  Treatment Diagnosis:    Insurance/Certification information:  MMO  PPO  Referring Physician:  Maximilian Costa Plan of care signed (Y/N):  Y  Visit# / total visits:  13/15  Pain level: /10     Time In:   1028  Time Out:   1137    Subjective:        Exercises:  Exercise/Equipment Resistance/Repetitions Other comments   StepOne   10 min            seated hip abd   [id number]                      flex 2r49u5xo                knee ext 2k88s2wu           sit/stand 3x10           toe raises 2x15    step ups 2x10x4\"            standing for time 2x3:00           single leg stance 4x20s ea      foam marching  X 2 mins      side/side amb                                Assessment:        Home Exercise Program:  provided 10/12/20    Manual Treatments:  NA    Modalities:  NA    Timed Code Treatment Minutes: Total Treatment Minutes:      Treatment/Activity Tolerance:  [x] Patient tolerated treatment well [] Patient limited by fatique  [] Patient limited by pain  [] Patient limited by other medical complications  [] Other:     Prognosis: [] Good [] Fair  [] Poor    Patient Requires Follow-up: [] Yes  [] No    Plan:   [x] Continue per plan of care [] Alter current plan (see comments)  [] Plan of care initiated [] Hold pending MD visit [] Discharge  Plan for Next Session:      See Weekly Progress Note: []  Yes  []  No  Next due:        Electronically signed by:   Walker Fothergill, MPT

## 2020-12-09 ENCOUNTER — HOSPITAL ENCOUNTER (OUTPATIENT)
Dept: PHYSICAL THERAPY | Age: 75
Setting detail: THERAPIES SERIES
Discharge: HOME | End: 2020-12-09
Payer: COMMERCIAL

## 2020-12-09 PROCEDURE — 97530 THERAPEUTIC ACTIVITIES: CPT | Performed by: PHYSICAL THERAPIST

## 2020-12-09 PROCEDURE — 97110 THERAPEUTIC EXERCISES: CPT | Performed by: PHYSICAL THERAPIST

## 2020-12-11 ENCOUNTER — HOSPITAL ENCOUNTER (OUTPATIENT)
Dept: PHYSICAL THERAPY | Age: 75
Setting detail: THERAPIES SERIES
Discharge: HOME | End: 2020-12-11
Payer: COMMERCIAL

## 2020-12-11 PROCEDURE — 97110 THERAPEUTIC EXERCISES: CPT | Performed by: PHYSICAL THERAPIST

## 2020-12-11 PROCEDURE — 97530 THERAPEUTIC ACTIVITIES: CPT | Performed by: PHYSICAL THERAPIST

## 2020-12-11 NOTE — PROGRESS NOTES
[address] [facility]                Phone: [phone]   Fax: [phone]    Physical Therapy Daily Treatment Note  Date:  2020    Patient Name:  Magui Archer    :    MRN: [id number]    Evaluating therapist:  MURRAY Ellis                    (10/9/20)  Restrictions/Precautions:    Diagnosis:  vascular diseases  Treatment Diagnosis:    Insurance/Certification information:  MMO  PPO  Referring Physician:  Virgle Mortimer Plan of care signed (Y/N):  Y  Visit# / total visits:  15/17  Pain level: /10     Time In:   1035  Time Out:   1135    Subjective:        Exercises:  Exercise/Equipment Resistance/Repetitions Other comments   StepOne   10 min            seated hip abd   [id number]                      flex [id number]                knee ext [id number]           sit/stand 3x10           toe raises 2x15    step ups 2x10x4\"            standing for time 2x3:00           single leg stance 4x20s ea      foam marching  X 2 mins      side/side amb                                Assessment:        Home Exercise Program:  provided 10/12/20    Manual Treatments:  NA    Modalities:  NA    Timed Code Treatment Minutes: Total Treatment Minutes:      Treatment/Activity Tolerance:  [x] Patient tolerated treatment well [] Patient limited by fatique  [] Patient limited by pain  [] Patient limited by other medical complications  [] Other:     Prognosis: [] Good [] Fair  [] Poor    Patient Requires Follow-up: [] Yes  [] No    Plan:   [x] Continue per plan of care [] Alter current plan (see comments)  [] Plan of care initiated [] Hold pending MD visit [] Discharge  Plan for Next Session:      See Weekly Progress Note: []  Yes  []  No  Next due:        Electronically signed by:   MURRAY Garcia

## 2020-12-11 NOTE — PROGRESS NOTES
S:  pt presents to therapy for two of two scheduled visit for the week; at this time he tells PT that  he continues to do his exercises at home and feels good overall; PT he is still walking more in the house using furniture for support as well as using st cane when  he walks outside;  no c/o buckling or LOB over week's time    O:  performed the exercises/tasks as written in the flowsheet for the week ending 12/2/20; strength  across B LE's grossly  4/, 4+5 for all ranges    A:  minh tx well; pt able to perform all requested tasks with good form and pacing noted; no c/o SOB during or after tx; strength across B LE's shows improvement in all ranges; transfers sit/stand SBA/I;  gait remains slow/guarded but stable overall with longer strides noted with [employer];  pt continues to  exhibit hyperext but it appears to be improving across B LE's;   static/dynamic balance is GOOD-; endurance for all prolonged activities remains at  GOOD+; pt again advised to start walking with [employer] at all times for safety's sake and try to walk for longer distances/time periods to build stamina and stability across B LE's     P:  cont with POC of strengthening/balance/endurance activities as pt tolerates for one more week with D/C to HEP at that time.

## 2020-12-14 ENCOUNTER — HOSPITAL ENCOUNTER (OUTPATIENT)
Dept: PHYSICAL THERAPY | Age: 75
Setting detail: THERAPIES SERIES
Discharge: HOME | End: 2020-12-14
Payer: COMMERCIAL

## 2020-12-14 PROCEDURE — 97110 THERAPEUTIC EXERCISES: CPT | Performed by: PHYSICAL THERAPIST

## 2020-12-14 PROCEDURE — 97530 THERAPEUTIC ACTIVITIES: CPT | Performed by: PHYSICAL THERAPIST

## 2020-12-14 NOTE — PROGRESS NOTES
[address] [facility]                Phone: [phone]   Fax: [phone]    Physical Therapy Daily Treatment Note  Date:  2020    Patient Name:  Margaret Mcnamara    :  [birth year]  MRN: [id number]    Evaluating therapist:  MURRAY Marks                    (10/9/20)  Restrictions/Precautions:    Diagnosis:  vascular diseases  Treatment Diagnosis:    Insurance/Certification information:  MMO  PPO  Referring Physician:  Pradeep Ward Plan of care signed (Y/N):  Y  Visit# / total visits:    Pain level: /10     Time In:   1031  Time Out:   1143    Subjective:        Exercises:  Exercise/Equipment Resistance/Repetitions Other comments   StepOne   10 min            seated hip abd   [id number]                      flex 1p01e2om                knee ext 6u98w2kl           sit/stand 3x10           toe raises 2x15    step ups 2x10x6\"            standing for time 2x3:00           single leg stance 4x20s ea      foam marching  X 2 mins      side/side amb                                Assessment:        Home Exercise Program:  provided 10/12/20    Manual Treatments:  NA    Modalities:  NA    Timed Code Treatment Minutes: Total Treatment Minutes:      Treatment/Activity Tolerance:  [x] Patient tolerated treatment well [] Patient limited by fatique  [] Patient limited by pain  [] Patient limited by other medical complications  [] Other:     Prognosis: [] Good [] Fair  [] Poor    Patient Requires Follow-up: [] Yes  [] No    Plan:   [x] Continue per plan of care [] Alter current plan (see comments)  [] Plan of care initiated [] Hold pending MD visit [] Discharge  Plan for Next Session:      See Weekly Progress Note: []  Yes  []  No  Next due:        Electronically signed by:   MURRAY Mckay

## 2020-12-18 ENCOUNTER — HOSPITAL ENCOUNTER (OUTPATIENT)
Dept: PHYSICAL THERAPY | Age: 75
Setting detail: THERAPIES SERIES
Discharge: HOME | End: 2020-12-18
Payer: COMMERCIAL

## 2020-12-18 PROCEDURE — 97110 THERAPEUTIC EXERCISES: CPT | Performed by: PHYSICAL THERAPIST

## 2020-12-18 PROCEDURE — 97530 THERAPEUTIC ACTIVITIES: CPT | Performed by: PHYSICAL THERAPIST

## 2020-12-18 NOTE — PROGRESS NOTES
[address] [facility]                Phone: [phone]   Fax: [phone]    Physical Therapy Daily Treatment Note  Date:  2020    Patient Name:  Ron Argueta    :    MRN: [id number]    Evaluating therapist:  MURRAY Guzman                    (10/9/20)  Restrictions/Precautions:    Diagnosis:  vascular diseases  Treatment Diagnosis:    Insurance/Certification information:  MMO  PPO  Referring Physician:  Kemi Arceo Plan of care signed (Y/N):  Y  Visit# / total visits:    Pain level: /10     Time In:   1042  Time Out:   1139    Subjective:        Exercises:  Exercise/Equipment Resistance/Repetitions Other comments   StepOne   10 min            seated hip abd   [id number]                      flex [id number]                knee ext 3r65a1fh           sit/stand 3x10           toe raises 2x15    step ups 2x10x6\"            standing for time 2x3:00           single leg stance 4x20s ea      foam marching  3x30s                                   Assessment:        Home Exercise Program:  provided 10/12/20    Manual Treatments:  NA    Modalities:  NA    Timed Code Treatment Minutes: Total Treatment Minutes:      Treatment/Activity Tolerance:  [x] Patient tolerated treatment well [] Patient limited by fatique  [] Patient limited by pain  [] Patient limited by other medical complications  [] Other:     Prognosis: [] Good [] Fair  [] Poor    Patient Requires Follow-up: [] Yes  [] No    Plan:   [x] Continue per plan of care [] Alter current plan (see comments)  [] Plan of care initiated [] Hold pending MD visit [] Discharge  Plan for Next Session:      See Weekly Progress Note: []  Yes  []  No  Next due:        Electronically signed by:   MURRAY Burkett

## 2021-01-18 ENCOUNTER — HOSPITAL ENCOUNTER (OUTPATIENT)
Dept: PHYSICAL THERAPY | Age: 76
Setting detail: THERAPIES SERIES
Discharge: HOME | End: 2021-01-18
Payer: COMMERCIAL

## 2021-01-18 NOTE — PROGRESS NOTES
[address] [facility]                Phone: [phone]   Fax: [phone]    To:  Dr. Dar Shirley       From: Hammad Luna      Patient: Soraida Cunha       : 1945  Diagnosis:       Date: 2021  Treatment Diagnosis:  vascular disease    Physical Therapy Discharge Note    Total Visits to date:   16 Cancels/No-shows to date:      Plan of Care/Treatment to date:  [x] Therapeutic Exercise    [] Modalities:  [x] Therapeutic Activity     [] Ultrasound  [] Electrical Stimulation  [x] Gait Training      [] Cervical Traction   [] Lumbar Traction  [x] Neuromuscular Re-education  [] Cold/hotpack [] Iontophoresis  [x] Instruction in HEP      Other:  [] Manual Therapy       []    [] Aquatic Therapy       []                            Progress towards goals:  pt reached all stated functional LTG's for therapy and was I with [facility] for home management of condition       Goal Status:  [] Achieved [] Partially Achieved  [] Not Achieved     Patient Status: [] Continue per initial plan of Care     [x] Patient now discharged to [facility] for home management of condition     [] Additional visits requested, Please re-certify for additional visits:          Electronically signed by: MURRAY Black     If you have any questions or concerns, please don't hesitate to call.   Thank you for your referral.

## 2021-03-21 ENCOUNTER — IMMUNIZATION (OUTPATIENT)
Dept: PRIMARY CARE CLINIC | Age: 76
End: 2021-03-21
Payer: COMMERCIAL

## 2021-03-21 PROCEDURE — 0001A COVID-19, PFIZER VACCINE 30MCG/0.3ML DOSE: CPT | Performed by: INTERNAL MEDICINE

## 2021-03-21 PROCEDURE — 91300 COVID-19, PFIZER VACCINE 30MCG/0.3ML DOSE: CPT | Performed by: INTERNAL MEDICINE

## 2021-04-19 ENCOUNTER — IMMUNIZATION (OUTPATIENT)
Dept: PRIMARY CARE CLINIC | Age: 76
End: 2021-04-19
Payer: COMMERCIAL

## 2021-04-19 PROCEDURE — 91300 COVID-19, PFIZER VACCINE 30MCG/0.3ML DOSE: CPT | Performed by: NURSE PRACTITIONER

## 2021-04-19 PROCEDURE — 0002A COVID-19, PFIZER VACCINE 30MCG/0.3ML DOSE: CPT | Performed by: NURSE PRACTITIONER

## 2023-06-26 NOTE — TELEPHONE ENCOUNTER
Pt called in and stated he received msg from Dr Cynthia Harman office about being denied.  Attempted to call Pt back, phone rings continuously with no answer and no voicemail
received return fax from Dr Parra's office re: referral. Dr Wilder Woods is unable to accept Pt based off of Dx and clinical notes.  Attempted to contact Pt however phone continuously rings, no option to leave a msg
n/a

## 2024-06-24 ENCOUNTER — HOSPITAL ENCOUNTER (OUTPATIENT)
Dept: PHYSICAL THERAPY | Age: 79
Setting detail: THERAPIES SERIES
Discharge: HOME | End: 2024-06-24
Payer: COMMERCIAL

## 2024-06-24 PROCEDURE — 97161 PT EVAL LOW COMPLEX 20 MIN: CPT

## 2024-06-24 NOTE — PROGRESS NOTES
[facility]                Phone: [phone]   Fax: [phone]    Physical Therapy  Out Patient Initial Evaluation    Date:  2024    Patient Name:  Mamadou Clifford    :  1945  MRN: [id number]    DIAGNOSIS:  Polyosteoarthritis  EVALUATION DATE:  24  REFERRING PHYSICIAN:  Dr Charles Rincon MD  CERTIFICATION PERIOD:  24 to 24    PROBLEMS FOUND DURING EVALUATION  No impairments   Reports unsure why he is here as he is indep all ADL's and playing golf 3x/week.     PATIENT GOALS  NA    REHAB POTENTIAL:  NA    RECOMMENDED TREATMENT PLAN TO ACHIEVE THE MUTUAL LONG TERM GOALS:  NA    FREQUENCY/DURATION:  NA    PLAN OF CARE:  D/C PT services       Thank you for the opportunity to work with your patient. If you have questions or comments, please feel free to contact me by phone or fax.      Electronically Signed by Herman Hoff, PT  2024        ___________________________________  2024    Physician     Date

## 2024-06-24 NOTE — PROGRESS NOTES
[facility]                Phone: [phone]   Fax: [phone]    Physical Therapy Daily Treatment Note  Date:  2024    Patient Name:  Mamadou Clifford    :  1945  MRN: [id number]    Restrictions/Precautions:    Diagnosis:  Polyosteoarthritis  Referring Physician:  Dr Charles Rincon MD    Visit# / total visits:  -  Pain level: /10   Time In:  Time Out:    Subjective:      Exercises:  Exercise/Equipment Resistance/Repetitions Other comments                                                                                                                                             Other Therapeutic Activities:      Home Exercise Program:      Manual Treatments:      Modalities:      Treatment/Activity Tolerance:  [] Patient tolerated treatment well [] Patient limited by fatique  [] Patient limited by pain  [] Patient limited by other medical complications  [] Other:     Plan:   [] Continue per plan of care [] Alter current plan (see comments)  [] Plan of care initiated [] Hold pending MD visit [] Discharge      Electronically signed by:  Herman Hoff PT

## 2024-06-26 ENCOUNTER — OFFICE VISIT (OUTPATIENT)
Dept: PHYSICAL MEDICINE AND REHAB | Age: 79
End: 2024-06-26
Payer: COMMERCIAL

## 2024-06-26 VITALS
HEIGHT: 67 IN | HEART RATE: 88 BPM | WEIGHT: 133 LBS | BODY MASS INDEX: 20.88 KG/M2 | SYSTOLIC BLOOD PRESSURE: 140 MMHG | DIASTOLIC BLOOD PRESSURE: 75 MMHG

## 2024-06-26 DIAGNOSIS — M21.372 LEFT FOOT DROP: ICD-10-CM

## 2024-06-26 DIAGNOSIS — M48.062 LUMBAR STENOSIS WITH NEUROGENIC CLAUDICATION: Primary | ICD-10-CM

## 2024-06-26 DIAGNOSIS — M54.17 RADICULOPATHY, LUMBOSACRAL REGION: ICD-10-CM

## 2024-06-26 PROCEDURE — G8420 CALC BMI NORM PARAMETERS: HCPCS | Performed by: PHYSICAL MEDICINE & REHABILITATION

## 2024-06-26 PROCEDURE — 99204 OFFICE O/P NEW MOD 45 MIN: CPT | Performed by: PHYSICAL MEDICINE & REHABILITATION

## 2024-06-26 PROCEDURE — 1123F ACP DISCUSS/DSCN MKR DOCD: CPT | Performed by: PHYSICAL MEDICINE & REHABILITATION

## 2024-06-26 PROCEDURE — 4004F PT TOBACCO SCREEN RCVD TLK: CPT | Performed by: PHYSICAL MEDICINE & REHABILITATION

## 2024-06-26 PROCEDURE — G8427 DOCREV CUR MEDS BY ELIG CLIN: HCPCS | Performed by: PHYSICAL MEDICINE & REHABILITATION

## 2024-06-26 RX ORDER — PREGABALIN 25 MG/1
25 CAPSULE ORAL 2 TIMES DAILY
COMMUNITY

## 2024-06-26 RX ORDER — ADALIMUMAB 40MG/0.4ML
KIT SUBCUTANEOUS
COMMUNITY
Start: 2024-03-04

## 2024-06-26 NOTE — PROGRESS NOTES
Helena Lemus, DO  [facility]  [address] [place] [address]  Phone: [phone]  Fax: [phone]    PCP: Charles Rincon MD  Date of visit: 6/26/24    Chief Complaint   Patient presents with    Back Pain     Back pain       Dear Dr. Rincon,     Thank you for referring your patient to be seen.    As you know,  Mamadou Clifford is a 78 y.o. male with past medical history as below who presents with low back pain for many years, worse recently. He had epidurals last in 2018 with relief. Since this time, he has been evaluated by NSGY, rheumatology, neurology. He was started on humira. Surgery was recommended for his neck and back. Thought to have myelopathy. EMG done by neurology. No large fiber neuropathy. Last saw neurology in 2020. He presents today to discuss repeat epidurals. The pain (using VAS) is rated Pain Score:   6/10, is described as achy, and is located in the low back with radiation to the legs where they are cramping. He has cramping in his calves and his feet. The symptoms have been worse since onset.  The pain is better with rest.  The pain is worse with walking. There is no associated numbness/tingling but reports legs feeling off.  There is weakness. There is no bowel/bladder changes.   Plays golf 3 days a week.     The prior workup has included: Xray, MRI L spine     The prior treatment has included:  PT: yes    Modalities:yes  OTC Tylenol: yes   NSAIDS: yes     Membrane stabilizers: lyrica    Muscle relaxers: none   Previous injections: Bilateral S1 TFESI x2, L5-ILESI   Previous surgery at this site: none but recommended     No Known Allergies    Current Outpatient Medications   Medication Sig Dispense Refill    HUMIRA, 2 PEN, 40 MG/0.4ML PNKT INJECT 40 MG UNDER THE SKIN EVERY 2 WEEKS      pregabalin (LYRICA) 25 MG capsule Take 1 capsule by mouth 2 times daily. States takes 1 every other day Max Daily Amount: 50 mg

## 2024-06-27 ENCOUNTER — PREP FOR PROCEDURE (OUTPATIENT)
Dept: PHYSICAL MEDICINE AND REHAB | Age: 79
End: 2024-06-27

## 2024-06-27 ENCOUNTER — TELEPHONE (OUTPATIENT)
Dept: PHYSICAL MEDICINE AND REHAB | Age: 79
End: 2024-06-27

## 2024-06-27 DIAGNOSIS — M48.062 LUMBAR STENOSIS WITH NEUROGENIC CLAUDICATION: Primary | ICD-10-CM

## 2024-06-27 DIAGNOSIS — M48.062 LUMBAR STENOSIS WITH NEUROGENIC CLAUDICATION: ICD-10-CM

## 2024-06-27 NOTE — TELEPHONE ENCOUNTER
Patient called back and he is scheduled on 7-2-24 for interlaminar epidural.  Patient was informed that the surgery center will call him the day before the procedure after 3 pm to inform him the time of the procedure.  Patient is aware and voiced understanding.

## 2024-06-27 NOTE — TELEPHONE ENCOUNTER
Patient called back and stated he can not do 7-2-24 his wife has an appointment that can not be changed.  I rescheduled him to 7-16-24.  Called and spoke with Heather from surgery scheduling to have her switch the appointment to 7-16-24 she stated that she switched it.

## 2024-06-27 NOTE — TELEPHONE ENCOUNTER
Called and spoke with the patient wife Brianna to have the patient call us back I received approval for epidural. Will wait for return call from patient.

## 2024-07-01 RX ORDER — SODIUM CHLORIDE 9 MG/ML
INJECTION, SOLUTION INTRAVENOUS PRN
Status: CANCELLED | OUTPATIENT
Start: 2024-07-01

## 2024-07-01 RX ORDER — SODIUM CHLORIDE 0.9 % (FLUSH) 0.9 %
5-40 SYRINGE (ML) INJECTION EVERY 12 HOURS SCHEDULED
Status: CANCELLED | OUTPATIENT
Start: 2024-07-01

## 2024-07-01 RX ORDER — SODIUM CHLORIDE 0.9 % (FLUSH) 0.9 %
5-40 SYRINGE (ML) INJECTION PRN
Status: CANCELLED | OUTPATIENT
Start: 2024-07-01

## 2024-07-02 ENCOUNTER — HOSPITAL ENCOUNTER (OUTPATIENT)
Dept: OPERATING ROOM | Age: 79
Setting detail: OUTPATIENT SURGERY
Discharge: HOME | End: 2024-07-02
Attending: PHYSICAL MEDICINE & REHABILITATION
Payer: COMMERCIAL

## 2024-07-02 ENCOUNTER — HOSPITAL ENCOUNTER (OUTPATIENT)
Age: 79
Setting detail: OUTPATIENT SURGERY
Discharge: HOME | End: 2024-07-02
Attending: PHYSICAL MEDICINE & REHABILITATION | Admitting: PHYSICAL MEDICINE & REHABILITATION
Payer: COMMERCIAL

## 2024-07-02 VITALS
BODY MASS INDEX: 21.38 KG/M2 | OXYGEN SATURATION: 97 % | DIASTOLIC BLOOD PRESSURE: 86 MMHG | WEIGHT: 133 LBS | HEART RATE: 54 BPM | RESPIRATION RATE: 14 BRPM | HEIGHT: 66 IN | SYSTOLIC BLOOD PRESSURE: 180 MMHG | TEMPERATURE: 98 F

## 2024-07-02 DIAGNOSIS — M54.16 LUMBAR RADICULOPATHY: ICD-10-CM

## 2024-07-02 PROCEDURE — 62323 NJX INTERLAMINAR LMBR/SAC: CPT | Performed by: PHYSICAL MEDICINE & REHABILITATION

## 2024-07-02 PROCEDURE — 3600000005 HC SURGERY LEVEL 5 BASE: Performed by: PHYSICAL MEDICINE & REHABILITATION

## 2024-07-02 PROCEDURE — 6360000004 HC RX CONTRAST MEDICATION: Performed by: PHYSICAL MEDICINE & REHABILITATION

## 2024-07-02 PROCEDURE — 2709999900 HC NON-CHARGEABLE SUPPLY: Performed by: PHYSICAL MEDICINE & REHABILITATION

## 2024-07-02 PROCEDURE — 2500000003 HC RX 250 WO HCPCS: Performed by: PHYSICAL MEDICINE & REHABILITATION

## 2024-07-02 PROCEDURE — 6360000002 HC RX W HCPCS: Performed by: PHYSICAL MEDICINE & REHABILITATION

## 2024-07-02 PROCEDURE — 7100000011 HC PHASE II RECOVERY - ADDTL 15 MIN: Performed by: PHYSICAL MEDICINE & REHABILITATION

## 2024-07-02 PROCEDURE — 2580000003 HC RX 258: Performed by: PHYSICAL MEDICINE & REHABILITATION

## 2024-07-02 PROCEDURE — 7100000010 HC PHASE II RECOVERY - FIRST 15 MIN: Performed by: PHYSICAL MEDICINE & REHABILITATION

## 2024-07-02 RX ORDER — SODIUM CHLORIDE 0.9 % (FLUSH) 0.9 %
5-40 SYRINGE (ML) INJECTION EVERY 12 HOURS SCHEDULED
Status: DISCONTINUED | OUTPATIENT
Start: 2024-07-02 | End: 2024-07-02 | Stop reason: HOSPADM

## 2024-07-02 RX ORDER — SODIUM CHLORIDE 9 MG/ML
INJECTION, SOLUTION INTRAVENOUS PRN
Status: DISCONTINUED | OUTPATIENT
Start: 2024-07-02 | End: 2024-07-02 | Stop reason: HOSPADM

## 2024-07-02 RX ORDER — SODIUM CHLORIDE 0.9 % (FLUSH) 0.9 %
5-40 SYRINGE (ML) INJECTION PRN
Status: DISCONTINUED | OUTPATIENT
Start: 2024-07-02 | End: 2024-07-02 | Stop reason: HOSPADM

## 2024-07-02 RX ORDER — LIDOCAINE HYDROCHLORIDE 10 MG/ML
INJECTION, SOLUTION EPIDURAL; INFILTRATION; INTRACAUDAL; PERINEURAL PRN
Status: DISCONTINUED | OUTPATIENT
Start: 2024-07-02 | End: 2024-07-02 | Stop reason: ALTCHOICE

## 2024-07-02 ASSESSMENT — PAIN - FUNCTIONAL ASSESSMENT
PAIN_FUNCTIONAL_ASSESSMENT: NONE - DENIES PAIN
PAIN_FUNCTIONAL_ASSESSMENT: 0-10
PAIN_FUNCTIONAL_ASSESSMENT: NONE - DENIES PAIN

## 2024-07-02 ASSESSMENT — PAIN DESCRIPTION - DESCRIPTORS: DESCRIPTORS: OTHER (COMMENT)

## 2024-07-02 NOTE — OP NOTE
The patient tolerated the procedure well and was discharged after an appropriate period of observation. If there are any complications, the patient was instructed to call us.   The patient is to follow-up with the requesting physician after the injection, preferably within two weeks.    Helena Lemus DO, FAAPMR   Board Certified Physical Medicine and Rehabilitation

## 2024-07-02 NOTE — DISCHARGE INSTRUCTIONS
Post-op instruction Block  Dr. Lemus  [phone]      Rest 12-24 hours following procedure. DO NOT DRIVE till the following day.    Keep dressing clean and dry. Do not bathe, shower, or sit in hot tub the day of procedure .You may remove the dressing following A.M.    You may return to work/school tomorrow.    Drink extra fluids for the next 24 hours.    Resume your regular diet.    Resume previously prescribed medications. Resume Coumadin, Plavix, NSAIDS, Ibuprofen products 24 hours after procedure.    You need to have a responsible adult stay with you this evening.    If you experience any discomfort relating to this procedure, you may take Tylenol 2 tablets every 4 hrs as needed for pain and/or apply ice to the affected area.    Please follow up with Dr. Lemus or Dr. Lezama. If you were a hip injection follow up with your orthopedic Doctor.    If you experience any unusual symptoms or problems, call your physician’s answering service at [phone] or go directly to [facility]’s Emergency Department.

## 2024-07-02 NOTE — H&P
palpitations, edema      Neurologic: See HPI.     MSK: See HPI.     Hematologic/Lymphatic/Immunologic: Denies bruising       Physical Exam:   Blood pressure (!) 170/88, pulse 98, temperature 98 °F (36.7 °C), temperature source Infrared, resp. rate 16, height 1.676 m (5' 6\"), weight 60.3 kg (133 lb), SpO2 (!) 62 %.   General: well developed and well nourished in no acute distress  Resp: symmetrical chest expansion, unlabored breathing, respirations unlabored.   CV: Heart rate is regular. Peripheral pulses are palpable  Skin: No rashes or ecchymosis. Normal turgor.   MSK: ttp lumbar psps    Neurological Exam:  SILT   4/5 bilateral AD, 4/5 left EHL, PF otherwise 5/5       Impression:      Lumbar stenosis     Plan:   Injection as planned today           Helena Lemus DO, FAAPMR   Board Certified Physical Medicine and Rehabilitation

## 2024-07-09 DIAGNOSIS — M48.062 LUMBAR STENOSIS WITH NEUROGENIC CLAUDICATION: ICD-10-CM

## (undated) DEVICE — NEEDLE HYPO 25GA L1.5IN BLU POLYPR HUB S STL REG BVL STR

## (undated) DEVICE — APPLICATOR MEDICATED 26 CC SOLUTION HI LT ORNG CHLORAPREP

## (undated) DEVICE — BANDAGE ADH W1XL3IN NAT FAB WVN FLX DURABLE N ADH PD SEAL

## (undated) DEVICE — ENCORE® LATEX TEXTURED SIZE 6.5, STERILE LATEX POWDER-FREE SURGICAL GLOVE: Brand: ENCORE

## (undated) DEVICE — NEEDLE HYPO 18GA L1.5IN PNK POLYPR HUB S STL REG BVL STR

## (undated) DEVICE — TOWEL,OR,DSP,ST,BLUE,STD,6/PK,12PK/CS: Brand: MEDLINE

## (undated) DEVICE — GAUZE,SPONGE,4"X4",12PLY,STERILE,LF,2'S: Brand: MEDLINE

## (undated) DEVICE — 3 ML SYRINGE LUER-LOCK TIP: Brand: MONOJECT

## (undated) DEVICE — MARKER,SKIN,WI/RULER AND LABELS: Brand: MEDLINE

## (undated) DEVICE — 3M™ RED DOT™ MONITORING ELECTRODE WITH FOAM TAPE AND STICKY GEL 2560, 50/BAG, 20/CASE, 72/PLT: Brand: RED DOT™

## (undated) DEVICE — 6 ML SYRINGE LUER-LOCK TIP: Brand: MONOJECT

## (undated) DEVICE — NON-DEHP CATHETER EXTENSION SET, MALE LUER LOCK ADAPTER